# Patient Record
Sex: FEMALE | Race: WHITE | NOT HISPANIC OR LATINO | URBAN - METROPOLITAN AREA
[De-identification: names, ages, dates, MRNs, and addresses within clinical notes are randomized per-mention and may not be internally consistent; named-entity substitution may affect disease eponyms.]

---

## 2021-04-27 ENCOUNTER — OFFICE VISIT (OUTPATIENT)
Dept: URBAN - METROPOLITAN AREA CLINIC 78 | Facility: CLINIC | Age: 46
End: 2021-04-27
Payer: COMMERCIAL

## 2021-04-27 VITALS
HEIGHT: 69 IN | BODY MASS INDEX: 35.52 KG/M2 | TEMPERATURE: 98.4 F | DIASTOLIC BLOOD PRESSURE: 82 MMHG | WEIGHT: 239.8 LBS | HEART RATE: 78 BPM | RESPIRATION RATE: 16 BRPM | SYSTOLIC BLOOD PRESSURE: 150 MMHG

## 2021-04-27 DIAGNOSIS — R11.2 NAUSEA AND VOMITING, INTRACTABILITY OF VOMITING NOT SPECIFIED, UNSPECIFIED VOMITING TYPE: ICD-10-CM

## 2021-04-27 DIAGNOSIS — K59.01 CONSTIPATION: ICD-10-CM

## 2021-04-27 DIAGNOSIS — E11.9 DIABETES: ICD-10-CM

## 2021-04-27 DIAGNOSIS — R12 HEARTBURN: ICD-10-CM

## 2021-04-27 PROCEDURE — 99244 OFF/OP CNSLTJ NEW/EST MOD 40: CPT | Performed by: INTERNAL MEDICINE

## 2021-04-27 RX ORDER — LANSOPRAZOLE 30 MG/1
1 CAPSULE BEFORE A MEAL CAPSULE, DELAYED RELEASE ORAL
Qty: 60 | Refills: 2 | OUTPATIENT
Start: 2021-04-27

## 2021-04-27 RX ORDER — PROMETHAZINE HYDROCHLORIDE 25 MG/1
1 SUPPOSITORY AS NEEDED SUPPOSITORY RECTAL
Qty: 40 | Refills: 1 | OUTPATIENT
Start: 2021-04-27 | End: 2021-06-26

## 2021-04-27 NOTE — PHYSICAL EXAM CONSTITUTIONAL:
well developed, well nourished , in mild acute distress , ambulating without difficulty , normal communication ability although clearly anxious when expressing herself

## 2021-04-27 NOTE — HPI-TODAY'S VISIT:
The patient was referred to us by Glory Griffin NP /Tata Kaplan MD for intractable nausea. A copy of this note will be sent to the referring physician.   She was diagnosed with vestibular neuritis and vestibular migraines ~ Feb 2020. Despite these issues having gotten better, she has had persistent/worsening nausea over the past few months. She describes it as intreactable and greatly affecting her QOL. The nausea is worse in the mornings. She cannot bring herself to eat up until 3PM on most days. Even the tase of water is "gross." She does vomit occasionally. She has been on zofran prn, which she does not feel is helpful at all. She has used a 1.5gm Scopolamine patch with little relief. If she gets overheated she will get extremely diaphoretic and lightheadedness. She has not had syncopal episodes. She denies hematemesis.  She has had chronic heartburn for which she used to take Prevacid and TUMS prn. The heartburn is much worse and she has now had to take the Prevacid QD (she is not sure whether she is taking 15 or 30mg). She has had marked decrease in her appetite. She also admits to occasional episodes of dysphagia/choking on ehr food.  Despite all these GI issues, she has been gaining weight, which she attrbitutes to the fact that she has been very sedentary.    She has had some constipation. She feels that she gets backed up frequenlty. No rectal bleeding, abdominal pain, diarrhea, or unintentional weight loss.   She states that she has had a "nervous stomach" all her life.  She also admits to severe anxiety which is not currently well controlled.  Her DM is overall well controlled. She is scheduled for a HBA1c check this week. Her fasting BG have been rangin in the 160-180's. She has on Trulicity for the past 3 months.    The patient does not take blood thinners. She takes a baby ASA.  The patient has never had a colonoscopy or EGD previously. There is no FH of colon cancer, her mother had colon polyps.  Her mother had HH repair and passed away from complications related to this surgery.

## 2021-04-28 ENCOUNTER — OFFICE VISIT (OUTPATIENT)
Dept: URBAN - METROPOLITAN AREA SURGERY CENTER 15 | Facility: SURGERY CENTER | Age: 46
End: 2021-04-28
Payer: COMMERCIAL

## 2021-04-28 DIAGNOSIS — Z12.11 COLON CANCER SCREENING: ICD-10-CM

## 2021-04-28 PROCEDURE — 992 APS NON BILLABLE: Performed by: INTERNAL MEDICINE

## 2021-04-28 RX ORDER — PROMETHAZINE HYDROCHLORIDE 25 MG/1
1 SUPPOSITORY AS NEEDED SUPPOSITORY RECTAL
Qty: 40 | Refills: 1 | Status: ACTIVE | COMMUNITY
Start: 2021-04-27 | End: 2021-06-26

## 2021-04-28 RX ORDER — LANSOPRAZOLE 30 MG/1
1 CAPSULE BEFORE A MEAL CAPSULE, DELAYED RELEASE ORAL
Qty: 60 | Refills: 2 | Status: ACTIVE | COMMUNITY
Start: 2021-04-27

## 2021-04-30 ENCOUNTER — WEB ENCOUNTER (OUTPATIENT)
Dept: URBAN - METROPOLITAN AREA CLINIC 78 | Facility: CLINIC | Age: 46
End: 2021-04-30

## 2021-05-07 ENCOUNTER — WEB ENCOUNTER (OUTPATIENT)
Dept: URBAN - METROPOLITAN AREA CLINIC 78 | Facility: CLINIC | Age: 46
End: 2021-05-07

## 2021-05-12 ENCOUNTER — OFFICE VISIT (OUTPATIENT)
Dept: URBAN - METROPOLITAN AREA SURGERY CENTER 15 | Facility: SURGERY CENTER | Age: 46
End: 2021-05-12

## 2021-08-04 ENCOUNTER — OFFICE VISIT (OUTPATIENT)
Dept: URBAN - METROPOLITAN AREA SURGERY CENTER 15 | Facility: SURGERY CENTER | Age: 46
End: 2021-08-04
Payer: COMMERCIAL

## 2021-08-04 DIAGNOSIS — R13.19 CERVICAL DYSPHAGIA: ICD-10-CM

## 2021-08-04 DIAGNOSIS — K29.40 ATROPHIC GASTRITIS: ICD-10-CM

## 2021-08-04 DIAGNOSIS — K22.8 COLUMNAR-LINED ESOPHAGUS: ICD-10-CM

## 2021-08-04 DIAGNOSIS — K21.00 ALKALINE REFLUX ESOPHAGITIS: ICD-10-CM

## 2021-08-04 PROCEDURE — G8907 PT DOC NO EVENTS ON DISCHARG: HCPCS | Performed by: INTERNAL MEDICINE

## 2021-08-04 PROCEDURE — 43239 EGD BIOPSY SINGLE/MULTIPLE: CPT | Performed by: INTERNAL MEDICINE

## 2021-08-04 RX ORDER — LANSOPRAZOLE 30 MG/1
1 CAPSULE BEFORE A MEAL CAPSULE, DELAYED RELEASE ORAL
Qty: 60 | Refills: 2 | Status: ACTIVE | COMMUNITY
Start: 2021-04-27

## 2022-01-11 ENCOUNTER — OFFICE VISIT (OUTPATIENT)
Dept: URBAN - METROPOLITAN AREA CLINIC 78 | Facility: CLINIC | Age: 47
End: 2022-01-11
Payer: COMMERCIAL

## 2022-01-11 VITALS
HEIGHT: 69 IN | WEIGHT: 243.8 LBS | SYSTOLIC BLOOD PRESSURE: 133 MMHG | HEART RATE: 82 BPM | DIASTOLIC BLOOD PRESSURE: 76 MMHG | TEMPERATURE: 97.2 F | BODY MASS INDEX: 36.11 KG/M2

## 2022-01-11 DIAGNOSIS — F41.9 ANXIETY: ICD-10-CM

## 2022-01-11 DIAGNOSIS — K31.89 INTESTINAL METAPLASIA OF GASTRIC MUCOSA: ICD-10-CM

## 2022-01-11 DIAGNOSIS — H81.90 DISORDER OF VESTIBULAR FUNCTION, UNSPECIFIED LATERALITY: ICD-10-CM

## 2022-01-11 DIAGNOSIS — E11.9 DIABETES: ICD-10-CM

## 2022-01-11 DIAGNOSIS — R13.10 DYSPHAGIA: ICD-10-CM

## 2022-01-11 DIAGNOSIS — R12 HEARTBURN: ICD-10-CM

## 2022-01-11 DIAGNOSIS — R63.0 ANOREXIA: ICD-10-CM

## 2022-01-11 DIAGNOSIS — K59.01 CONSTIPATION: ICD-10-CM

## 2022-01-11 DIAGNOSIS — R11.2 NAUSEA AND VOMITING, INTRACTABILITY OF VOMITING NOT SPECIFIED, UNSPECIFIED VOMITING TYPE: ICD-10-CM

## 2022-01-11 PROCEDURE — 99214 OFFICE O/P EST MOD 30 MIN: CPT | Performed by: INTERNAL MEDICINE

## 2022-01-11 RX ORDER — SCOPOLAMINE 1 MG/3D
1 PATCH TO SKIN BEHIND THE EAR AS NEEDED PATCH TRANSDERMAL
Refills: 2 | OUTPATIENT
Start: 2022-01-11 | End: 2022-04-11

## 2022-01-11 RX ORDER — GABAPENTIN 100 MG/1
1 CAPSULE CAPSULE ORAL ONCE A DAY
Status: ACTIVE | COMMUNITY

## 2022-01-11 RX ORDER — EMPAGLIFLOZIN 10 MG/1
1 TABLET TABLET, FILM COATED ORAL ONCE A DAY
Status: ACTIVE | COMMUNITY

## 2022-01-11 RX ORDER — LEVOMEFOLATE/ALGAL OIL 15-90.314
1 CAPSULE CAPSULE ORAL ONCE A DAY
Status: ACTIVE | COMMUNITY

## 2022-01-11 RX ORDER — LANSOPRAZOLE 30 MG/1
1 CAPSULE BEFORE A MEAL CAPSULE, DELAYED RELEASE ORAL
Qty: 60 | Refills: 2 | Status: ON HOLD | COMMUNITY
Start: 2021-04-27

## 2022-01-11 RX ORDER — LANSOPRAZOLE 30 MG/1
1 CAPSULE BEFORE A MEAL CAPSULE, DELAYED RELEASE ORAL
Qty: 60 | Refills: 2 | OUTPATIENT

## 2022-01-11 NOTE — HPI-TODAY'S VISIT:
The patient was referred to us by Glory Griffin NP /Tata Kaplan MD for intractable nausea. A copy of this note will be sent to the referring physician.   She was diagnosed with vestibular neuritis and vestibular migraines ~ Feb 2020. Despite these issues having gotten better, she has had persistent/worsening nausea over the past few months. She describes it as intreactable and greatly affecting her QOL. The nausea is worse in the mornings. She cannot bring herself to eat up until 3PM on most days. Even the tase of water is "gross." She does vomit occasionally. She has been on zofran prn, which she does not feel is helpful at all. She has used a 1.5gm Scopolamine patch with little relief. If she gets overheated she will get extremely diaphoretic and lightheaded. She has not had syncopal episodes. She denies hematemesis.  The dizziness has been less consistent since her last visit. The nausea has not gotten any better though. Unfortunately she did vomit a couple of times this past week.  She has had marked decrease in her appetite.  She has had an improvmeent in her swallowing without further episodes of dysphagia/choking on her food.   She has had chronic heartburn for which she used to take Prevacid and TUMS prn. The heartburn had lately gotten worse and she has now had to take Prevacid 30mg QD.  Despite all these GI issues, she has been gaining weight, which she attrbitutes to the fact that she has been very sedentary.    She has had some constipation. She feels that she gets backed up frequenlty. No rectal bleeding, abdominal pain, diarrhea, or unintentional weight loss.   She states that she has had a "nervous stomach" all her life.  She also admits to severe anxiety which is not currently well controlled.  Her DM is overall well controlled.  The patient does not take blood thinners. She takes a baby ASA.  The patient has never had a colonoscopy previously. There is no FH of colon cancer, her mother had colon polyps.  Her mother had HH repair and passed away from complications related to this surgery.   Summary of prior workup: - EGD by me on 8/2021: Normal esoph (biopsies consistent with reflux), irregular Z-line (no BE), 3cm HH, lax LES, normal stomach (no H pylori/+ GIM), no retained food in the stomach. patent pylorus, normal duodenum/papilla (no celiac sprue).

## 2022-03-18 ENCOUNTER — LAB OUTSIDE AN ENCOUNTER (OUTPATIENT)
Dept: URBAN - METROPOLITAN AREA CLINIC 78 | Facility: CLINIC | Age: 47
End: 2022-03-18

## 2022-05-12 ENCOUNTER — DASHBOARD ENCOUNTERS (OUTPATIENT)
Age: 47
End: 2022-05-12

## 2022-05-12 ENCOUNTER — OFFICE VISIT (OUTPATIENT)
Dept: URBAN - METROPOLITAN AREA CLINIC 78 | Facility: CLINIC | Age: 47
End: 2022-05-12
Payer: COMMERCIAL

## 2022-05-12 VITALS
DIASTOLIC BLOOD PRESSURE: 72 MMHG | SYSTOLIC BLOOD PRESSURE: 122 MMHG | HEART RATE: 75 BPM | WEIGHT: 254.4 LBS | BODY MASS INDEX: 37.68 KG/M2 | TEMPERATURE: 98 F | HEIGHT: 69 IN

## 2022-05-12 DIAGNOSIS — K59.01 CONSTIPATION: ICD-10-CM

## 2022-05-12 DIAGNOSIS — R63.0 ANOREXIA: ICD-10-CM

## 2022-05-12 DIAGNOSIS — H81.90 DISORDER OF VESTIBULAR FUNCTION, UNSPECIFIED LATERALITY: ICD-10-CM

## 2022-05-12 DIAGNOSIS — K31.89 INTESTINAL METAPLASIA OF GASTRIC MUCOSA: ICD-10-CM

## 2022-05-12 DIAGNOSIS — F41.9 ANXIETY: ICD-10-CM

## 2022-05-12 DIAGNOSIS — R13.10 DYSPHAGIA: ICD-10-CM

## 2022-05-12 DIAGNOSIS — R12 HEARTBURN: ICD-10-CM

## 2022-05-12 DIAGNOSIS — E11.9 DIABETES: ICD-10-CM

## 2022-05-12 DIAGNOSIS — R11.2 NAUSEA AND VOMITING, INTRACTABILITY OF VOMITING NOT SPECIFIED, UNSPECIFIED VOMITING TYPE: ICD-10-CM

## 2022-05-12 DIAGNOSIS — E78.1 HIGH TRIGLYCERIDES: ICD-10-CM

## 2022-05-12 DIAGNOSIS — Z12.11 COLON CANCER SCREENING: ICD-10-CM

## 2022-05-12 PROBLEM — 302870006: Status: ACTIVE | Noted: 2022-05-12

## 2022-05-12 PROBLEM — 48694002: Status: ACTIVE | Noted: 2021-04-27

## 2022-05-12 PROBLEM — 14760008 CONSTIPATION: Status: ACTIVE | Noted: 2021-04-27

## 2022-05-12 PROBLEM — 111552007 DIABETES MELLITUS WITHOUT COMPLICATION: Status: ACTIVE | Noted: 2021-04-27

## 2022-05-12 PROBLEM — 40739000 DYSPHAGIA: Status: ACTIVE | Noted: 2021-04-27

## 2022-05-12 PROBLEM — 87118001 VERTIGINOUS SYNDROME: Status: ACTIVE | Noted: 2022-01-11

## 2022-05-12 PROBLEM — 79890006: Status: ACTIVE | Noted: 2021-04-27

## 2022-05-12 PROCEDURE — 99214 OFFICE O/P EST MOD 30 MIN: CPT | Performed by: INTERNAL MEDICINE

## 2022-05-12 RX ORDER — EMPAGLIFLOZIN 25 MG/1
TABLET, FILM COATED ORAL
Qty: 30 | Status: ACTIVE | COMMUNITY

## 2022-05-12 RX ORDER — NEBIVOLOL 5 MG/1
TABLET ORAL
Qty: 30 | Status: ACTIVE | COMMUNITY

## 2022-05-12 RX ORDER — SODIUM SULFATE, MAGNESIUM SULFATE, AND POTASSIUM CHLORIDE 17.75; 2.7; 2.25 G/1; G/1; G/1
12 TABLETS TABLET ORAL
Qty: 24 TABLETS | Refills: 0 | OUTPATIENT
Start: 2022-05-12 | End: 2022-05-13

## 2022-05-12 RX ORDER — ICOSAPENT ETHYL 500 MG/1
CAPSULE ORAL
Qty: 240 | Status: ACTIVE | COMMUNITY

## 2022-05-12 RX ORDER — HYDROXYZINE HYDROCHLORIDE 50 MG/1
TABLET, FILM COATED ORAL
Qty: 60 | Status: ACTIVE | COMMUNITY

## 2022-05-12 RX ORDER — FENOFIBRATE 145 MG/1
TABLET, FILM COATED ORAL
Qty: 30 | Status: ACTIVE | COMMUNITY

## 2022-05-12 RX ORDER — EMPAGLIFLOZIN 10 MG/1
1 TABLET TABLET, FILM COATED ORAL ONCE A DAY
Status: DISCONTINUED | COMMUNITY

## 2022-05-12 RX ORDER — LANSOPRAZOLE 30 MG/1
1 CAPSULE BEFORE A MEAL CAPSULE, DELAYED RELEASE ORAL ONCE A DAY
Qty: 30 CAPSULE | Refills: 2 | OUTPATIENT

## 2022-05-12 RX ORDER — LEVOMEFOLATE/ALGAL OIL 15-90.314
1 CAPSULE CAPSULE ORAL ONCE A DAY
Status: DISCONTINUED | COMMUNITY

## 2022-05-12 RX ORDER — GABAPENTIN 100 MG/1
CAPSULE ORAL
Qty: 90 | Status: ACTIVE | COMMUNITY

## 2022-05-12 RX ORDER — PROCHLORPERAZINE 25 MG/1
1 TABLET EVERY 6 HOURS AS NEEDED FOR NAUSEA SUPPOSITORY RECTAL
Qty: 40 | Refills: 2 | OUTPATIENT

## 2022-05-12 RX ORDER — LANSOPRAZOLE 30 MG/1
1 CAPSULE BEFORE A MEAL CAPSULE, DELAYED RELEASE ORAL
Qty: 60 | Refills: 2 | Status: DISCONTINUED | COMMUNITY

## 2022-05-12 RX ORDER — ESZOPICLONE 1 MG/1
TABLET, FILM COATED ORAL
Qty: 30 | Status: ACTIVE | COMMUNITY

## 2022-05-12 RX ORDER — NEBIVOLOL HYDROCHLORIDE 5 MG/1
TABLET ORAL
Qty: 30 | Status: ACTIVE | COMMUNITY

## 2022-05-12 RX ORDER — DULOXETINE 60 MG/1
1 CAPSULE CAPSULE, DELAYED RELEASE PELLETS ORAL ONCE A DAY
Status: ACTIVE | COMMUNITY

## 2022-05-12 RX ORDER — DIAZEPAM 5 MG/1
TABLET ORAL
Qty: 10 | Status: ACTIVE | COMMUNITY

## 2022-05-12 RX ORDER — LANSOPRAZOLE 30 MG/1
CAPSULE, DELAYED RELEASE ORAL
Qty: 60 | Status: ACTIVE | COMMUNITY

## 2022-05-12 RX ORDER — GABAPENTIN 100 MG/1
1 CAPSULE CAPSULE ORAL ONCE A DAY
Status: DISCONTINUED | COMMUNITY

## 2022-05-12 RX ORDER — DULOXETINE HYDROCHLORIDE 30 MG/1
CAPSULE, DELAYED RELEASE ORAL
Qty: 30 | Status: ACTIVE | COMMUNITY

## 2022-05-12 NOTE — HPI-TODAY'S VISIT:
The patient was referred to us by Glory Griffin NP /Tata Kaplan MD for intractable nausea. A copy of this note will be sent to the referring physician.   She was diagnosed with vestibular neuritis and vestibular migraines ~ Feb 2020. Despite these issues having gotten better, she has had persistent/worsening nausea over the past few months. She describes it as intreactable and greatly affecting her QOL. The nausea is worse in the mornings. She cannot bring herself to eat up until 3PM on most days. Even the tase of water is "gross." She does vomit occasionally. She has been on zofran prn, which she does not feel is helpful at all. She has used a 1.5gm Scopolamine patch with little relief. If she gets overheated she will get extremely diaphoretic and lightheaded. She has not had syncopal episodes. She denies hematemesis.  The dizziness has been less consistent since her last visit. The nausea has not gotten any better though. She has had marked decrease in her appetite.  Despite all these GI issues, she has been gaining weight, which she attrbitutes to the fact that she has been  She has had an improvement in her swallowing without further episodes of dysphagia/choking on her food.   She has had chronic heartburn for which she used to take Prevacid and TUMS prn. The heartburn had lately gotten worse and she has now had to take Prevacid 30mg BID with excellent control.  very sedentary.   She has been having regular, soft, formed BMs daily with a good sense of evacuation. She is not using Phenergan or Zofran as neither helped. She developed a rash to Scopolamine patch.  Today we reviewed the results of her GES which was normal.  No rectal bleeding, abdominal pain, diarrhea, or unintentional weight loss.   She states that she has had a "nervous stomach" all her life.  She also admits to severe anxiety which is not currently well controlled.  Her DM is poorly controlled. Last HbA1c was 12.2%. TG were in the 900 range. She has had pancreatitis twice in the past due to elevated TG.   The patient does not take blood thinners. She takes a baby ASA.  The patient has never had a colonoscopy previously. There is no FH of colon cancer, her mother had colon polyps.  Her mother had HH repair and passed away from complications related to this surgery.   Summary of prior workup: - Labs on 4/19/2022: WBC 9.0, hemoglobin 11.1, MCV 73, platelets 317.  Glucose 240, BUN 10 creatinine 0.6, sodium 135,  potassium 4.3, calcium 9.3, total protein 7.7, albumin 4.4, total bilirubin 0.3, alkaline phosphatase 66, AST 28, ALT 26.  Triglycerides 969, hemoglobin A1c 12.2%.  TSH 1.24.  Vitamin B12 817.  Ferritin 14. - Normal GES in March 2022. - EGD by me on 8/2021: Normal esoph (biopsies consistent with reflux), irregular Z-line (no BE), 3cm HH, lax LES, normal stomach (no H pylori/+ GIM), no retained food in the stomach. patent pylorus, normal duodenum/papilla (no celiac sprue).

## 2022-06-17 ENCOUNTER — OFFICE VISIT (OUTPATIENT)
Dept: URBAN - METROPOLITAN AREA SURGERY CENTER 15 | Facility: SURGERY CENTER | Age: 47
End: 2022-06-17

## 2022-06-17 ENCOUNTER — TELEPHONE ENCOUNTER (OUTPATIENT)
Dept: URBAN - METROPOLITAN AREA CLINIC 78 | Facility: CLINIC | Age: 47
End: 2022-06-17

## 2022-06-17 ENCOUNTER — WEB ENCOUNTER (OUTPATIENT)
Dept: URBAN - METROPOLITAN AREA CLINIC 78 | Facility: CLINIC | Age: 47
End: 2022-06-17

## 2022-06-17 RX ORDER — FENOFIBRATE 145 MG/1
TABLET, FILM COATED ORAL
Qty: 30 | Status: ACTIVE | COMMUNITY

## 2022-06-17 RX ORDER — HYDROXYZINE HYDROCHLORIDE 50 MG/1
TABLET, FILM COATED ORAL
Qty: 60 | Status: ACTIVE | COMMUNITY

## 2022-06-17 RX ORDER — PROCHLORPERAZINE 25 MG/1
1 TABLET EVERY 6 HOURS AS NEEDED FOR NAUSEA SUPPOSITORY RECTAL
Qty: 40 | Refills: 2 | Status: ACTIVE | COMMUNITY

## 2022-06-17 RX ORDER — NEBIVOLOL 5 MG/1
TABLET ORAL
Qty: 30 | Status: ACTIVE | COMMUNITY

## 2022-06-17 RX ORDER — GABAPENTIN 100 MG/1
CAPSULE ORAL
Qty: 90 | Status: ACTIVE | COMMUNITY

## 2022-06-17 RX ORDER — LANSOPRAZOLE 30 MG/1
1 CAPSULE BEFORE A MEAL CAPSULE, DELAYED RELEASE ORAL ONCE A DAY
Qty: 30 CAPSULE | Refills: 2 | Status: ACTIVE | COMMUNITY

## 2022-06-17 RX ORDER — DIAZEPAM 5 MG/1
TABLET ORAL
Qty: 10 | Status: ACTIVE | COMMUNITY

## 2022-06-17 RX ORDER — ICOSAPENT ETHYL 500 MG/1
CAPSULE ORAL
Qty: 240 | Status: ACTIVE | COMMUNITY

## 2022-06-17 RX ORDER — LANSOPRAZOLE 30 MG/1
CAPSULE, DELAYED RELEASE ORAL
Qty: 60 | Status: ACTIVE | COMMUNITY

## 2022-06-17 RX ORDER — EMPAGLIFLOZIN 25 MG/1
TABLET, FILM COATED ORAL
Qty: 30 | Status: ACTIVE | COMMUNITY

## 2022-06-17 RX ORDER — ESZOPICLONE 1 MG/1
TABLET, FILM COATED ORAL
Qty: 30 | Status: ACTIVE | COMMUNITY

## 2022-06-17 RX ORDER — NEBIVOLOL HYDROCHLORIDE 5 MG/1
TABLET ORAL
Qty: 30 | Status: ACTIVE | COMMUNITY

## 2022-06-17 RX ORDER — DULOXETINE HYDROCHLORIDE 30 MG/1
CAPSULE, DELAYED RELEASE ORAL
Qty: 30 | Status: ACTIVE | COMMUNITY

## 2022-06-17 RX ORDER — DULOXETINE 60 MG/1
1 CAPSULE CAPSULE, DELAYED RELEASE PELLETS ORAL ONCE A DAY
Status: ACTIVE | COMMUNITY

## 2022-07-21 NOTE — PHYSICAL EXAM HENT:
2nd attempt made to Chandni, transplant coordinator to discuss forms that have not been received. Detailed message left in attempt to obtain forms. Left fax number. Awaiting forms and call back from Chandni.    Head,  normocephalic,  atraumatic,  Face,  Face within normal limits,  Ears,  External ears within normal limits,  Nose/Nasopharynx,  External nose  normal appearance,  nares patent,  no nasal discharge,  Mouth and Throat,  Oral cavity appearance normal,  Breath odor normal,  Lips,  Appearance normal

## 2022-09-22 ENCOUNTER — TELEPHONE ENCOUNTER (OUTPATIENT)
Dept: URBAN - METROPOLITAN AREA CLINIC 78 | Facility: CLINIC | Age: 47
End: 2022-09-22

## 2022-09-22 RX ORDER — LANSOPRAZOLE 30 MG/1
1 CAPSULE BEFORE A MEAL CAPSULE, DELAYED RELEASE ORAL ONCE A DAY
Qty: 30 CAPSULE | Refills: 2

## 2023-01-19 ENCOUNTER — TELEPHONE ENCOUNTER (OUTPATIENT)
Dept: URBAN - METROPOLITAN AREA CLINIC 78 | Facility: CLINIC | Age: 48
End: 2023-01-19

## 2023-01-19 RX ORDER — LANSOPRAZOLE 30 MG/1
1 CAPSULE BEFORE A MEAL CAPSULE, DELAYED RELEASE ORAL ONCE A DAY
Qty: 30 CAPSULE | Refills: 2

## 2023-04-25 ENCOUNTER — TELEPHONE ENCOUNTER (OUTPATIENT)
Dept: URBAN - METROPOLITAN AREA CLINIC 78 | Facility: CLINIC | Age: 48
End: 2023-04-25

## 2023-04-25 RX ORDER — LANSOPRAZOLE 30 MG/1
1 CAPSULE BEFORE A MEAL CAPSULE, DELAYED RELEASE ORAL ONCE A DAY
Qty: 30 CAPSULE | Refills: 2